# Patient Record
Sex: MALE | Race: BLACK OR AFRICAN AMERICAN | Employment: PART TIME | URBAN - METROPOLITAN AREA
[De-identification: names, ages, dates, MRNs, and addresses within clinical notes are randomized per-mention and may not be internally consistent; named-entity substitution may affect disease eponyms.]

---

## 2018-04-02 ENCOUNTER — HOSPITAL ENCOUNTER (EMERGENCY)
Age: 23
Discharge: HOME | End: 2018-04-02
Attending: EMERGENCY MEDICINE
Payer: SELF-PAY

## 2018-04-02 VITALS
WEIGHT: 155 LBS | BODY MASS INDEX: 20.99 KG/M2 | OXYGEN SATURATION: 100 % | TEMPERATURE: 98.2 F | HEIGHT: 72 IN | DIASTOLIC BLOOD PRESSURE: 92 MMHG | RESPIRATION RATE: 16 BRPM | SYSTOLIC BLOOD PRESSURE: 141 MMHG | HEART RATE: 68 BPM

## 2018-04-02 DIAGNOSIS — K04.7 DENTAL INFECTION: Primary | ICD-10-CM

## 2018-04-02 PROCEDURE — 99282 EMERGENCY DEPT VISIT SF MDM: CPT

## 2018-04-02 RX ORDER — HYDROCODONE BITARTRATE AND ACETAMINOPHEN 5; 325 MG/1; MG/1
1 TABLET ORAL
Qty: 20 TAB | Refills: 0 | Status: SHIPPED | OUTPATIENT
Start: 2018-04-02

## 2018-04-02 RX ORDER — PENICILLIN V POTASSIUM 500 MG/1
500 TABLET, FILM COATED ORAL 4 TIMES DAILY
Qty: 40 TAB | Refills: 0 | Status: SHIPPED | OUTPATIENT
Start: 2018-04-02 | End: 2018-04-12

## 2018-04-02 NOTE — ED NOTES
Patient here with left lower dental pain. Patient reports chronic problem however states swelling started yesterday. Denies fevers. Emergency Department Nursing Plan of Care       The Nursing Plan of Care is developed from the Nursing assessment and Emergency Department Attending provider initial evaluation. The plan of care may be reviewed in the ED Provider note.     The Plan of Care was developed with the following considerations:   Patient / Family readiness to learn indicated by:verbalized understanding  Persons(s) to be included in education: patient  Barriers to Learning/Limitations:No    Signed     Eboni Knight RN    4/2/2018   9:50 AM

## 2018-04-02 NOTE — DISCHARGE INSTRUCTIONS
Dental Pain: After Your Visit  Your Care Instructions  The most common cause of dental pain is tooth decay. It can also be caused by an infection of the tooth (abscess) or gum, a tooth that has not broken all the way through the gum (impacted tooth), or a problem with the nerve-filled center of the tooth. Follow-up care is a key part of your treatment and safety. Be sure to make and go to all appointments, and call your doctor if you are having problems. Its also a good idea to know your test results and keep a list of the medicines you take. How can you care for yourself at home? · Contact a dentist for follow-up care. · Put ice or a cold pack on the outside of your mouth for 10 to 20 minutes at a time to reduce pain and swelling. Put a thin cloth between the ice and your skin. · Take an over-the-counter pain medicine, such as acetaminophen (Tylenol), ibuprofen (Advil, Motrin), or naproxen (Aleve). Read and follow all instructions on the label. · Do not take two or more pain medicines at the same time unless the doctor told you to. Many pain medicines have acetaminophen, which is Tylenol. Too much acetaminophen (Tylenol) can be harmful. · Rinse your mouth with warm salt water every 2 hours to help relieve pain and swelling from an infected tooth. Mix 1 teaspoon of salt in 8 ounces of water. · If your doctor prescribed antibiotics, take them as directed. Do not stop taking them just because you feel better. You need to take the full course of antibiotics. When should you call for help? Call your doctor now or seek immediate medical care if:  · You have signs of infection, such as:  ¨ Increased pain, swelling, warmth, or redness. ¨ Pus draining from the gum, tooth, or face. ¨ A fever. Watch closely for changes in your health, and be sure to contact your doctor if:  · You do not get better as expected. Where can you learn more?    Go to [employer]  Enter F264 in the search box to learn more about \"Dental Pain: After Your Visit. \"   © 1180-8402 [employer]. Care instructions adapted under license by [facility] (which disclaims liability or warranty for this information). This care instruction is for use with your licensed healthcare professional. If you have questions about a medical condition or this instruction, always ask your healthcare professional. [address] any warranty or liability for your use of this information. Content Version: 14.5.661365;  Last Revised: May 17, 2013

## 2018-04-02 NOTE — ED PROVIDER NOTES
EMERGENCY DEPARTMENT HISTORY AND PHYSICAL EXAM      Date: 4/2/2018  Patient Name: Simona Payton    History of Presenting Illness     Chief Complaint   Patient presents with    Dental Pain     left lower x 1 day with facial swelling. \"I think its my wisdom tooth. \"       History Provided By: Patient    HPI: Simona Payton, 23 y.o. male with PMHx significant for asthma, presents ambulatory to the ED with cc of b/l lower dental pain x 1 week with assoc facial swelling that started this am. Pt states he thinks his wisdom teeth are coming in. Denies fever/chills, drainage,trouble swallowing. Has not f/u with dentist. Rates pain 8/10. Has taken motrin with little relief. Denies ear pain. PCP: Marci Smith MD    There are no other complaints, changes, or physical findings at this time. Current Outpatient Prescriptions   Medication Sig Dispense Refill    penicillin v potassium (VEETID) 500 mg tablet Take 1 Tab by mouth four (4) times daily for 10 days. 40 Tab 0    HYDROcodone-acetaminophen (NORCO) 5-325 mg per tablet Take 1 Tab by mouth every four (4) hours as needed for Pain. Max Daily Amount: 6 Tabs. 20 Tab 0    ALBUTEROL IN Take  by inhalation. Past History     Past Medical History:  Past Medical History:   Diagnosis Date    Asthma        Past Surgical History:  History reviewed. No pertinent surgical history. Family History:  History reviewed. No pertinent family history. Social History:  Social History   Substance Use Topics    Smoking status: Never Smoker    Smokeless tobacco: Never Used    Alcohol use No       Allergies:  No Known Allergies      Review of Systems   Review of Systems   Constitutional: Negative for chills and fever. HENT: Positive for dental problem and facial swelling. Negative for congestion, ear pain, rhinorrhea, sneezing, sore throat and tinnitus. Eyes: Negative for photophobia and visual disturbance. Respiratory: Negative for shortness of breath. Cardiovascular: Negative for chest pain. Gastrointestinal: Negative for abdominal pain, constipation, diarrhea, nausea and vomiting. Genitourinary: Negative for flank pain. Musculoskeletal: Negative for back pain and myalgias. Skin: Negative for color change, pallor, rash and wound. Neurological: Negative for dizziness, weakness, light-headedness and headaches. All other systems reviewed and are negative. Physical Exam   Physical Exam   Constitutional: He is oriented to person, place, and time. He appears well-developed and well-nourished. No distress. HENT:   Head: Normocephalic and atraumatic. Right Ear: Tympanic membrane, external ear and ear canal normal.   Left Ear: Tympanic membrane, external ear and ear canal normal.   Nose: Nose normal. Right sinus exhibits no maxillary sinus tenderness and no frontal sinus tenderness. Left sinus exhibits no maxillary sinus tenderness and no frontal sinus tenderness. Mouth/Throat: Uvula is midline, oropharynx is clear and moist and mucous membranes are normal. No trismus in the jaw. No dental abscesses or uvula swelling. Eyes: Conjunctivae are normal.   Cardiovascular: Normal rate, regular rhythm and normal heart sounds. Pulmonary/Chest: Effort normal and breath sounds normal. No respiratory distress. Abdominal: Soft. Bowel sounds are normal. There is no tenderness. Musculoskeletal: Normal range of motion. Neurological: He is alert and oriented to person, place, and time. No cranial nerve deficit. Skin: Skin is warm. No rash noted. Psychiatric: He has a normal mood and affect. His behavior is normal.   Nursing note and vitals reviewed. Diagnostic Study Results     Labs -   No results found for this or any previous visit (from the past 12 hour(s)).     Radiologic Studies -   No orders to display     CT Results  (Last 48 hours)    None        CXR Results  (Last 48 hours)    None            Medical Decision Making   I am the first provider for this patient. I reviewed the vital signs, available nursing notes, past medical history, past surgical history, family history and social history. Vital Signs-Reviewed the patient's vital signs. Patient Vitals for the past 12 hrs:   Temp Pulse Resp BP SpO2   04/02/18 0942 98.2 °F (36.8 °C) 68 16 132/85 100 %       Records Reviewed: Nursing Notes and Old Medical Records    Provider Notes (Medical Decision Making):   DDx: Dental pain vs infection vs abscess    ED Course:   Initial assessment performed. The patients presenting problems have been discussed, and they are in agreement with the care plan formulated and outlined with them. I have encouraged them to ask questions as they arise throughout their visit. Disposition:  10:49 AM  Discussed dx and treatment plan. Discussed importance of  PCP follow up. All questions answered. Pt voiced they understood. Return if sx worsen. PLAN:  1. Current Discharge Medication List      START taking these medications    Details   penicillin v potassium (VEETID) 500 mg tablet Take 1 Tab by mouth four (4) times daily for 10 days. Qty: 40 Tab, Refills: 0    Associated Diagnoses: Dental infection      HYDROcodone-acetaminophen (NORCO) 5-325 mg per tablet Take 1 Tab by mouth every four (4) hours as needed for Pain. Max Daily Amount: 6 Tabs. Qty: 20 Tab, Refills: 0    Associated Diagnoses: Dental infection           2. Follow-up Information     Follow up With Details Comments MD Fifi Schedule an appointment as soon as possible for a visit As needed [address] [facility],[address]  [address]  [phone]          Return to ED if worse     Diagnosis     Clinical Impression:   1.  Dental infection

## 2018-04-02 NOTE — LETTER
[facility] EMERGENCY DEPT 
[address] [address] 
[phone] Work/School Note Date: 4/2/2018 To Whom It May concern: 
 
Melissa Lake was seen and treated today in the emergency room by the following provider(s): 
Attending Provider: Marilu Burgos MD 
Physician Assistant: LAYLA Montejo. Melissa Lake may return to work on 4/3/2018. Sincerely, LAYLA Montejo